# Patient Record
Sex: FEMALE | Race: WHITE | Employment: UNEMPLOYED
[De-identification: names, ages, dates, MRNs, and addresses within clinical notes are randomized per-mention and may not be internally consistent; named-entity substitution may affect disease eponyms.]

---

## 2017-06-24 ENCOUNTER — HEALTH MAINTENANCE LETTER (OUTPATIENT)
Age: 52
End: 2017-06-24

## 2019-10-29 ENCOUNTER — MEDICAL CORRESPONDENCE (OUTPATIENT)
Dept: HEALTH INFORMATION MANAGEMENT | Facility: CLINIC | Age: 54
End: 2019-10-29

## 2019-10-29 DIAGNOSIS — Z01.818 EXAMINATION PRIOR TO CHEMOTHERAPY: Primary | ICD-10-CM

## 2019-11-01 ENCOUNTER — HOSPITAL ENCOUNTER (OUTPATIENT)
Dept: CARDIOLOGY | Facility: CLINIC | Age: 54
Discharge: HOME | End: 2019-11-01
Attending: OBSTETRICS & GYNECOLOGY | Admitting: OBSTETRICS & GYNECOLOGY
Payer: MEDICARE

## 2019-11-01 DIAGNOSIS — Z01.818 EXAMINATION PRIOR TO CHEMOTHERAPY: ICD-10-CM

## 2019-11-01 PROCEDURE — 93005 ELECTROCARDIOGRAM TRACING: CPT

## 2019-11-01 PROCEDURE — 93010 ELECTROCARDIOGRAM REPORT: CPT | Performed by: INTERNAL MEDICINE

## 2019-11-05 LAB — INTERPRETATION ECG - MUSE: NORMAL

## 2019-11-12 ENCOUNTER — HOSPITAL PATHOLOGY (OUTPATIENT)
Dept: OTHER | Facility: CLINIC | Age: 54
End: 2019-11-12

## 2019-11-15 LAB — COPATH REPORT: NORMAL

## 2022-03-04 ENCOUNTER — OFFICE VISIT (OUTPATIENT)
Dept: NEUROSURGERY | Facility: CLINIC | Age: 57
End: 2022-03-04
Payer: COMMERCIAL

## 2022-03-04 VITALS — DIASTOLIC BLOOD PRESSURE: 88 MMHG | SYSTOLIC BLOOD PRESSURE: 138 MMHG | HEART RATE: 80 BPM | OXYGEN SATURATION: 99 %

## 2022-03-04 DIAGNOSIS — D17.79 EPIDURAL LIPOMATOSIS: Primary | ICD-10-CM

## 2022-03-04 PROCEDURE — 99204 OFFICE O/P NEW MOD 45 MIN: CPT | Performed by: NURSE PRACTITIONER

## 2022-03-04 ASSESSMENT — PAIN SCALES - GENERAL: PAINLEVEL: EXTREME PAIN (8)

## 2022-03-04 NOTE — PROGRESS NOTES
"Dr. Lee Stallworth   [facility] Neurosurgery Clinic Visit      CC: low back pain    Primary Care Provider: Franky Rivas    Reason For Visit:   I was asked by Rafa Villa NP to consult on the patient for: epidural lipomatosis       HPI: Sophia Munoz is a 56 year old female who presents for evaluation of chronic low back pain. Symptoms started years ago and have worsened over the last year. Today, patient reports constant low back pain that radiates to right anterior thigh. She reports difficulty walking due to leg \"fatigue\" and low back pain. She has started to use an electric scooter when at the store. Describes the pain as constant. Pain is worsened with walking, standing, sitting. Patient tried injections a couple years ago, she is not interested in trying them again. She has not tried PT. She follows with [facility] for chronic pain management, currently on [facility]. Denies any falls, foot drop, saddle anesthesia, or bladder/bowel incontinence.     Current pain: 8/10     Past Medical History:   Diagnosis Date     NO ACTIVE PROBLEMS        Past Medical History reviewed with patient during visit.    Past Surgical History:   Procedure Laterality Date     NO HISTORY OF SURGERY       Past Surgical History reviewed with patient during visit.    Current Outpatient Medications   Medication     ARIPiprazole (ABILIFY PO)     azithromycin (ZITHROMAX) 250 MG tablet     TAMOXIFEN CITRATE PO     Venlafaxine HCl (EFFEXOR PO)     No current facility-administered medications for this visit.       Allergies   Allergen Reactions     Penicillins        Social History     Socioeconomic History     Marital status: Single     Spouse name: Not on file     Number of children: 2     Years of education: Not on file     Highest education level: Not on file   Occupational History     Occupation: account exec.   Tobacco Use     Smoking status: Current Some Day Smoker     Smokeless tobacco: Never Used "     Tobacco comment: 1-2 cigarettes/week   Substance and Sexual Activity     Alcohol use: Yes     Comment: several bottles wine/week (15 glasses/wk)     Drug use: No     Comment: no previous drug abuse     Sexual activity: Not on file   Other Topics Concern     Parent/sibling w/ CABG, MI or angioplasty before 65F 55M? Not Asked   Social History Narrative     Not on file     Social Determinants of Health     Financial Resource Strain: Not on file   Food Insecurity: Not on file   Transportation Needs: Not on file   Physical Activity: Not on file   Stress: Not on file   Social Connections: Not on file   Intimate Partner Violence: Not on file   Housing Stability: Not on file       Family History   Problem Relation Age of Onset     Family History Negative No family hx of      Alcohol/Drug Brother          of complications of ETOH use       ROS: 10 point ROS neg other than the symptoms noted above in the HPI.    Vital Signs: There were no vitals taken for this visit.    Physical Examination:  Constitutional:  Alert, well nourished, NAD.  HEENT: Normocephalic, atraumatic.   Pulmonary:  Without shortness of breath, normal effort.   Cardiovascular:  No pitting edema of BLE.      Neurological:  Awake  Alert  Oriented x 3  Speech clear  Cranial nerves II - XII grossly intact  Face symmetric  Motor exam:  Iliopsoas  (hip flexion)               Right: 5/5  Left:  5/5  Quadriceps  (knee extension)       Right:  5/5  Left:  5/5  Hamstrings  (knee flexion)            Right:  5/5  Left:  5/5  Gastroc Soleus  (PF)                          Right:  5/5  Left:  5/5  Tibialis Ant  (DF)                          Right:  5/5  Left:  5/5  EHL                          Right:  5/5  Left:  5/5         Sensation normal to BLE    Reflexes are 2+ in the patellar and Achilles. There is no clonus.    Musculoskeletal:  Gait: Able to stand from a seated position. Normal non-antalgic, non-myelopathic gait.   Tenderness to palpation of the lower  "lumbar spine.   Straight leg raise is negative bilaterally.      Imaging:   MRI of the lumbar spine from 8/5/21 was reviewed in the office today. Reveals epidural lipomatosis contributing to spinal canal stenosis at L3-4 and L4-5.     Assessment/Plan:   56 year old female with chronic low back pain radiating to right anterior thigh, difficulty walking due to leg \"fatigue\" and low back pain. MRI reveals epidural lipomatosis contributing to spinal canal stenosis at L3-4 and L4-5. Patient tried injections a couple years ago, she is not interested in trying them again. She follows with [facility] for chronic pain management, currently on [facility].     Imaging reviewed with patient and we discussed next steps. She would like to try a course of PT first, then follow-up with Dr. Stallworth in clinic to discuss possible surgical options.     Advised patient to call our clinic with any questions or concerns. Discussed red flag symptoms and advised to seek medical attention if these develop. Patient voiced understanding and agreement.      Thelma Nelson [facility] Neurosurgery  [address] [address]  Tel [phone]  Pager [phone]      "

## 2022-03-04 NOTE — PATIENT INSTRUCTIONS
Referral for physical therapy.     Please stop at our  to schedule a follow-up appointment with Dr. Stallworth.

## 2022-03-04 NOTE — NURSING NOTE
"Sophia Munoz is a 56 year old female who presents for:  Chief Complaint   Patient presents with     Consult     Low back pain        Initial Vitals:  /88   Pulse 80   SpO2 99%  Estimated body mass index is 25.84 kg/m  as calculated from the following:    Height as of 12/15/03: 5' 9\" (1.753 m).    Weight as of 12/15/03: 175 lb (79.4 kg).. There is no height or weight on file to calculate BSA. BP completed using cuff size: large  Extreme Pain (8)    Joce Moe MA    "

## 2022-03-04 NOTE — LETTER
"    3/4/2022         RE: Sophia Munoz  [address]  [place] [address]        Dear Colleague,    Thank you for referring your patient, Sophia Munoz, to the [facility]UROLOG[facility]. Please see a copy of my visit note below.    Dr. Lee Stallworth   [facility] Neurosurgery Clinic Visit      CC: low back pain    Primary Care Provider: Franky Rivas    Reason For Visit:   I was asked by Rafa Villa NP to consult on the patient for: epidural lipomatosis       HPI: Sophia Munoz is a 56 year old female who presents for evaluation of chronic low back pain. Symptoms started years ago and have worsened over the last year. Today, patient reports constant low back pain that radiates to right anterior thigh. She reports difficulty walking due to leg \"fatigue\" and low back pain. She has started to use an electric scooter when at the store. Describes the pain as constant. Pain is worsened with walking, standing, sitting. Patient tried injections a couple years ago, she is not interested in trying them again. She has not tried PT. She follows with [facility] for chronic pain management, currently on [place]. Denies any falls, foot drop, saddle anesthesia, or bladder/bowel incontinence.     Current pain: 8/10     Past Medical History:   Diagnosis Date     NO ACTIVE PROBLEMS        Past Medical History reviewed with patient during visit.    Past Surgical History:   Procedure Laterality Date     NO HISTORY OF SURGERY       Past Surgical History reviewed with patient during visit.    Current Outpatient Medications   Medication     ARIPiprazole (ABILIFY PO)     azithromycin (ZITHROMAX) 250 MG tablet     TAMOXIFEN CITRATE PO     Venlafaxine HCl (EFFEXOR PO)     No current facility-administered medications for this visit.       Allergies   Allergen Reactions     Penicillins        Social History     Socioeconomic History     Marital status: Single     Spouse name: " Not on file     Number of children: 2     Years of education: Not on file     Highest education level: Not on file   Occupational History     Occupation: account exec.   Tobacco Use     Smoking status: Current Some Day Smoker     Smokeless tobacco: Never Used     Tobacco comment: 1-2 cigarettes/week   Substance and Sexual Activity     Alcohol use: Yes     Comment: several bottles wine/week (15 glasses/wk)     Drug use: No     Comment: no previous drug abuse     Sexual activity: Not on file   Other Topics Concern     Parent/sibling w/ CABG, MI or angioplasty before 65F 55M? Not Asked   Social History Narrative     Not on file     Social Determinants of Health     Financial Resource Strain: Not on file   Food Insecurity: Not on file   Transportation Needs: Not on file   Physical Activity: Not on file   Stress: Not on file   Social Connections: Not on file   Intimate Partner Violence: Not on file   Housing Stability: Not on file       Family History   Problem Relation Age of Onset     Family History Negative No family hx of      Alcohol/Drug Brother          of complications of ETOH use       ROS: 10 point ROS neg other than the symptoms noted above in the HPI.    Vital Signs: There were no vitals taken for this visit.    Physical Examination:  Constitutional:  Alert, well nourished, NAD.  HEENT: Normocephalic, atraumatic.   Pulmonary:  Without shortness of breath, normal effort.   Cardiovascular:  No pitting edema of BLE.      Neurological:  Awake  Alert  Oriented x 3  Speech clear  Cranial nerves II - XII grossly intact  Face symmetric  Motor exam:  Iliopsoas  (hip flexion)               Right: 5/5  Left:  5/5  Quadriceps  (knee extension)       Right:  5/5  Left:  5/5  Hamstrings  (knee flexion)            Right:  5/5  Left:  5/5  Gastroc Soleus  (PF)                          Right:  5/5  Left:  5/5  Tibialis Ant  (DF)                          Right:  5/5  Left:  5/5  EHL                          Right:  5/5   "Left:  5/5         Sensation normal to BLE    Reflexes are 2+ in the patellar and Achilles. There is no clonus.    Musculoskeletal:  Gait: Able to stand from a seated position. Normal non-antalgic, non-myelopathic gait.   Tenderness to palpation of the lower lumbar spine.   Straight leg raise is negative bilaterally.      Imaging:   MRI of the lumbar spine from 8/5/21 was reviewed in the office today. Reveals epidural lipomatosis contributing to spinal canal stenosis at L3-4 and L4-5.     Assessment/Plan:   56 year old female with chronic low back pain radiating to right anterior thigh, difficulty walking due to leg \"fatigue\" and low back pain. MRI reveals epidural lipomatosis contributing to spinal canal stenosis at L3-4 and L4-5. Patient tried injections a couple years ago, she is not interested in trying them again. She follows with [facility] for chronic pain management, currently on [facility].     Imaging reviewed with patient and we discussed next steps. She would like to try a course of PT first, then follow-up with Dr. Stallworth in clinic to discuss possible surgical options.     Advised patient to call our clinic with any questions or concerns. Discussed red flag symptoms and advised to seek medical attention if these develop. Patient voiced understanding and agreement.      Thelma Nelson CNP  [facility] Neurosurgery  [address] [address]  Tel [phone]  Pager [phone]          Again, thank you for allowing me to participate in the care of your patient.        Sincerely,        Thelma Nelson NP    "

## 2025-06-27 ENCOUNTER — HOSPITAL ENCOUNTER (OUTPATIENT)
Dept: PET IMAGING | Facility: CLINIC | Age: 60
Setting detail: NUCLEAR MEDICINE
Discharge: HOME | End: 2025-06-27
Attending: INTERNAL MEDICINE | Admitting: INTERNAL MEDICINE
Payer: MEDICARE

## 2025-06-27 DIAGNOSIS — D48.5 NEOPLASM OF UNCERTAIN BEHAVIOR OF SKIN: ICD-10-CM

## 2025-06-27 DIAGNOSIS — C50.919 BREAST CA (H): ICD-10-CM

## 2025-06-27 PROCEDURE — 343N000001 HC RX 343 MED OP 636

## 2025-06-27 PROCEDURE — A9591 FLUOROESTRADIOL F 18: HCPCS

## 2025-06-27 PROCEDURE — 78815 PET IMAGE W/CT SKULL-THIGH: CPT | Mod: PI

## 2025-06-27 PROCEDURE — 999N000128 HC STATISTIC PERIPHERAL IV START W/O US GUIDANCE

## 2025-06-27 RX ADMIN — FLUOROESTRADIOL F 18 6.7 MILLICURIE: 100 INJECTION INTRAVENOUS at 13:48
